# Patient Record
Sex: FEMALE | Employment: OTHER | URBAN - METROPOLITAN AREA
[De-identification: names, ages, dates, MRNs, and addresses within clinical notes are randomized per-mention and may not be internally consistent; named-entity substitution may affect disease eponyms.]

---

## 2020-01-13 ENCOUNTER — THERAPY VISIT (OUTPATIENT)
Dept: PHYSICAL THERAPY | Facility: CLINIC | Age: 65
End: 2020-01-13
Payer: COMMERCIAL

## 2020-01-13 DIAGNOSIS — M54.50 LUMBAGO: ICD-10-CM

## 2020-01-13 PROCEDURE — 97530 THERAPEUTIC ACTIVITIES: CPT | Mod: GP | Performed by: PHYSICAL THERAPIST

## 2020-01-13 PROCEDURE — 97161 PT EVAL LOW COMPLEX 20 MIN: CPT | Mod: GP | Performed by: PHYSICAL THERAPIST

## 2020-01-13 PROCEDURE — 97110 THERAPEUTIC EXERCISES: CPT | Mod: GP | Performed by: PHYSICAL THERAPIST

## 2020-01-13 NOTE — PROGRESS NOTES
"[facility] Initial Evaluation  Subjective:  The history is provided by the patient. No  was used.        Problem details: Patient saw MD 1-6-20 for chronic LBP with recent exacerbation of with onset of intermittent \"sharp\" pains to the right of the spine.  Pain is described as \"aching\", central low back primarily with intermittent \"sharp\" pains to the right of the spine and she gets \"tired\" due to her low back, rates symptoms 0-3/10.  She denies buttock or LE symptoms.  Symptoms increase most with standing 1', tolerance of 15-20', walking>10' (better with cart), doing dishes, bending backwards, sitting >1 hour especially if playing cards and repeatedly reaching, vacuuming and sweeping>a couple minutes. She also needs to hoe in her garden sitting in a chair due to LBP.  Symptoms decrease with sitting down temporarily, lying down and using ice.  Patient sleeps prone without increase pain, does not wake due to pain.  Patient had MRI 1 month ago, was told not progressed from last imaging.  .             General health as reported by patient is good. Pertinent medical history includes:  Overweight, cancer and osteoarthritis.   Other medical allergies details: none.  Surgeries include:  Orthopedic surgery. Other surgery history details: left TKA.  Current medications:  Anti-inflammatory (cholesterol, acid reflux, ibuprofen 2-3x/day (2400 or less mg/day), Tylenol 3000 mg/day).       Pain is the same all the time. Since onset symptoms are gradually worsening. Special tests:  MRI.     Patient is Retired nurse.   Barriers include:  None as reported by patient.  Red flags:  None as reported by patient.                      Objective:  Standing Alignment:        Lumbar:  Lordosis decr                           Lumbar/SI Evaluation  ROM:    AROM Lumbar:   Flexion:          WNL  Ext:                    25%   Side Bend:        Left:     Right:   Rotation:           Left:     Right:   Side " Glide:        Left:  WNL    Right:  WNL          Lumbar Myotomes:  not assessed                  Neural Tension/Mobility:      Left side:Slump  negative.     Right side:   Slump  negative.         Spinal Segmental Conclusions: Tenderness with spring testing L5-L3                                                        General     ROS  Symptoms prior to test movements:  1/10 central LB  Correction of sitting posture with lumbar roll:  No effect  RUI with hips against counter:  No effect  Prone:  1/10 central  REIL:  Increase, no worse        Assessment/Plan:    Patient is a 64 year old female with lumbar complaints.    Patient has the following significant findings with corresponding treatment plan.                Diagnosis 1:  LBP    Pain -  self management, education, directional preference exercise and home program  Decreased ROM/flexibility - therapeutic exercise and home program  Decreased function - therapeutic activities and home program  Impaired posture - neuro re-education and home program      Low complexity using standardized patient assessment instrument and/or measureable assessment of functional outcome.  Cumulative Therapy Evaluation is: Low complexity.    Previous and current functional limitations:  (See Goal Flow Sheet for this information)    Short term and Long term goals: (See Goal Flow Sheet for this information)     Communication ability:  Patient appears to be able to clearly communicate and understand verbal and written communication and follow directions correctly.  Treatment Explanation - The following has been discussed with the patient:   RX ordered/plan of care  Anticipated outcomes  Possible risks and side effects  This patient would benefit from PT intervention to resume normal activities.   Rehab potential is good.    Frequency:  1 X week, once daily  Duration:  for 6 weeks  Discharge Plan:  Achieve all LTG.  Independent in home treatment program.  Reach maximal therapeutic  benefit.    Please refer to the daily flowsheet for treatment today, total treatment time and time spent performing 1:1 timed codes.

## 2020-01-13 NOTE — LETTER
"[facility] PHYSICAL THERAPY  [address] [place] [id number] [phone]    2020    Re: Belkis Rodriguez   :   1955  MRN:  [id number]   REFERRING PHYSICIAN:   Sameer Salmon    [facility] PHYSICAL [facility]    Date of Initial Evaluation:  2020  Visits:  Rxs Used: 1  Reason for Referral:  Lumbago    EVALUATION SUMMARY    [facility] Initial Evaluation  Subjective:  Problem details: Patient saw MD 20 for chronic LBP with recent exacerbation of with onset of intermittent \"sharp\" pains to the right of the spine.  Pain is described as \"aching\", central low back primarily with intermittent \"sharp\" pains to the right of the spine and she gets \"tired\" due to her low back, rates symptoms 0-3/10.  She denies buttock or LE symptoms.  Symptoms increase most with standing 1', tolerance of 15-20', walking>10' (better with cart), doing dishes, bending backwards, sitting >1 hour especially if playing cards and repeatedly reaching, vacuuming and sweeping>a couple minutes. She also needs to hoe in her garden sitting in a chair due to LBP.  Symptoms decrease with sitting down temporarily, lying down and using ice.  Patient sleeps prone without increase pain, does not wake due to pain.  Patient had MRI 1 month ago, was told not progressed from last imaging. General health as reported by patient is good. Pertinent medical history includes:  Overweight, cancer and osteoarthritis. Other medical allergies details: none.  Surgeries include:  Orthopedic surgery. Other surgery history details: left TKA.  Current medications:  Anti-inflammatory (cholesterol, acid reflux, ibuprofen 2-3x/day (2400 or less mg/day), Tylenol 3000 mg/day).  Pain is the same all the time. Since onset symptoms are gradually worsening. Special tests:  MRI.   Patient is Retired nurse.  Barriers include:  None as reported by patient.  " Red flags:  None as reported by patient.    Objective:  Standing Alignment:    Lumbar:  Lordosis decr  Lumbar/SI Evaluation  ROM:    AROM Lumbar:   Flexion:          WNL  Ext:                    25%   Side Bend:        Left:     Right:   Rotation:           Left:     Right:   Side Glide:        Left:  WNL    Right:  WNL  Lumbar Myotomes:  not assessed  Neural Tension/Mobility:    Left side:Slump  negative.   Right side:   Slump  negative.   Spinal Segmental Conclusions: Tenderness with spring testing L5-L3     ROS  Symptoms prior to test movements:  1/10 central LB  Correction of sitting posture with lumbar roll:  No effect  RUI with hips against counter:  No effect  Prone:  1/10 central  REIL:  Increase, no worse    Assessment/Plan:    Patient is a 64 year old female with lumbar complaints.    Patient has the following significant findings with corresponding treatment plan.                Diagnosis 1:  LBP  Pain -  self management, education, directional preference exercise and home program  Decreased ROM/flexibility - therapeutic exercise and home program  Decreased function - therapeutic activities and home program  Impaired posture - neuro re-education and home program    Low complexity using standardized patient assessment instrument and/or measureable assessment of functional outcome.  Cumulative Therapy Evaluation is: Low complexity.    Previous and current functional limitations:  (See Goal Flow Sheet for this information)    Short term and Long term goals: (See Goal Flow Sheet for this information)     Communication ability:  Patient appears to be able to clearly communicate and understand verbal and written communication and follow directions correctly.  Treatment Explanation - The following has been discussed with the patient:   RX ordered/plan of care  Anticipated outcomes  Possible risks and side effects  This patient would benefit from PT intervention to resume normal activities.   Rehab potential is  good.    Frequency:  1 X week, once daily  Duration:  for 6 weeks  Discharge Plan:  Achieve all LTG.  Independent in home treatment program.  Reach maximal therapeutic benefit.    Thank you for your referral.    INQUIRIES  Therapist: Mansi Santoro PT   [facility] PHYSICAL THERAPY  [address] [address]  Phone: [phone]  Fax: [phone]

## 2020-01-22 ENCOUNTER — THERAPY VISIT (OUTPATIENT)
Dept: PHYSICAL THERAPY | Facility: CLINIC | Age: 65
End: 2020-01-22
Payer: COMMERCIAL

## 2020-01-22 DIAGNOSIS — M54.50 LUMBAGO: ICD-10-CM

## 2020-01-22 PROCEDURE — 97530 THERAPEUTIC ACTIVITIES: CPT | Mod: GP | Performed by: PHYSICAL THERAPIST

## 2020-01-22 PROCEDURE — 97110 THERAPEUTIC EXERCISES: CPT | Mod: GP | Performed by: PHYSICAL THERAPIST

## 2020-02-03 ENCOUNTER — THERAPY VISIT (OUTPATIENT)
Dept: PHYSICAL THERAPY | Facility: CLINIC | Age: 65
End: 2020-02-03
Payer: COMMERCIAL

## 2020-02-03 DIAGNOSIS — M54.50 LUMBAGO: ICD-10-CM

## 2020-02-03 PROCEDURE — 97110 THERAPEUTIC EXERCISES: CPT | Mod: GP | Performed by: PHYSICAL THERAPIST

## 2020-03-20 PROBLEM — M54.50 LUMBAGO: Status: RESOLVED | Noted: 2020-01-13 | Resolved: 2020-03-20

## 2020-03-20 NOTE — PROGRESS NOTES
"Discharge Note    Progress reporting period is from 1-13-19 to Feb 3, 2020.      Belkis failed to follow up and current status is unknown.  Please see information below for last relevant information on current status.  Patient seen for 3 visits.    SUBJECTIVE  At last visit she reported her cough was improving but still present.  Slowly improving, but was more sore that day overall than typical - \"all over\" with symptoms LB and bilateral knees.  [place] sat and played cards, more sitting than usual over the weekend as well.  Continues to be consistent with REIL and REIL with pt OP - at least 4-5x/day, does on bed.  Intermittently does RUI as well using hands or counter and has started to do with onset of symptoms in standing and is pleasantly surprised she can reduce or abolish symptoms temporarily.   .  Pain level at last visit 1/10(LB ).     Previous pain level was  (0-3/10).   Changes in function:  Yes (See Goal flowsheet attached for changes in current functional level)  Adverse reaction to treatment or activity: None    OBJECTIVE  Unknown at discharge as patient did not return as planned.    ASSESSMENT/PLAN  Diagnosis: LBP   Patient did not return for further visits so problems/goal status is unknown.  Assessment of Progress: current status is unknown.    Last current status: Pt is progressing slower than anticipated   Self Management Plans:  HEP  I have re-evaluated this patient and find that the nature, scope, duration and intensity of the therapy is appropriate for the medical condition of the patient.  Belkis continues to require the following intervention to meet STG and LTG's:  HEP.    Recommendations:  Patient did not return for follow-up visits as expected.  Will discharge physical therapy chart at this time.      Please refer to the daily flowsheet for treatment today, total treatment time and time spent performing 1:1 timed codes.        "

## 2020-10-26 ENCOUNTER — THERAPY VISIT (OUTPATIENT)
Dept: PHYSICAL THERAPY | Facility: CLINIC | Age: 65
End: 2020-10-26
Payer: COMMERCIAL

## 2020-10-26 DIAGNOSIS — Z96.651 AFTERCARE FOLLOWING RIGHT KNEE JOINT REPLACEMENT SURGERY: Primary | ICD-10-CM

## 2020-10-26 DIAGNOSIS — Z47.1 AFTERCARE FOLLOWING RIGHT KNEE JOINT REPLACEMENT SURGERY: Primary | ICD-10-CM

## 2020-10-26 PROCEDURE — 97110 THERAPEUTIC EXERCISES: CPT | Mod: GP | Performed by: PHYSICAL THERAPIST

## 2020-10-26 PROCEDURE — 97161 PT EVAL LOW COMPLEX 20 MIN: CPT | Mod: GP | Performed by: PHYSICAL THERAPIST

## 2020-10-26 ASSESSMENT — ACTIVITIES OF DAILY LIVING (ADL)
HOW_WOULD_YOU_RATE_THE_OVERALL_FUNCTION_OF_YOUR_KNEE_DURING_YOUR_USUAL_DAILY_ACTIVITIES?: ABNORMAL
PAIN: THE SYMPTOM AFFECTS MY ACTIVITY MODERATELY
SIT WITH YOUR KNEE BENT: ACTIVITY IS NOT DIFFICULT
RISE FROM A CHAIR: ACTIVITY IS SOMEWHAT DIFFICULT
SWELLING: THE SYMPTOM AFFECTS MY ACTIVITY MODERATELY
WEAKNESS: THE SYMPTOM AFFECTS MY ACTIVITY SEVERELY
GO DOWN STAIRS: ACTIVITY IS SOMEWHAT DIFFICULT
SQUAT: ACTIVITY IS FAIRLY DIFFICULT
KNEE_ACTIVITY_OF_DAILY_LIVING_SCORE: 48.57
KNEE_ACTIVITY_OF_DAILY_LIVING_SUM: 34
RAW_SCORE: 34
HOW_WOULD_YOU_RATE_THE_CURRENT_FUNCTION_OF_YOUR_KNEE_DURING_YOUR_USUAL_DAILY_ACTIVITIES_ON_A_SCALE_FROM_0_TO_100_WITH_100_BEING_YOUR_LEVEL_OF_KNEE_FUNCTION_PRIOR_TO_YOUR_INJURY_AND_0_BEING_THE_INABILITY_TO_PERFORM_ANY_OF_YOUR_USUAL_DAILY_ACTIVITIES?: 30
LIMPING: THE SYMPTOM AFFECTS MY ACTIVITY MODERATELY
GO UP STAIRS: ACTIVITY IS SOMEWHAT DIFFICULT
KNEEL ON THE FRONT OF YOUR KNEE: I AM UNABLE TO DO THE ACTIVITY
STIFFNESS: THE SYMPTOM AFFECTS MY ACTIVITY MODERATELY
GIVING WAY, BUCKLING OR SHIFTING OF KNEE: THE SYMPTOM AFFECTS MY ACTIVITY SLIGHTLY
STAND: ACTIVITY IS FAIRLY DIFFICULT
WALK: ACTIVITY IS MINIMALLY DIFFICULT
AS_A_RESULT_OF_YOUR_KNEE_INJURY,_HOW_WOULD_YOU_RATE_YOUR_CURRENT_LEVEL_OF_DAILY_ACTIVITY?: ABNORMAL

## 2020-10-26 NOTE — LETTER
"[facility] PHYSICAL THERAPY  [address] [id number] [phone]    2020    Re: Belkis Rodriguez   :   1955  MRN:  [id number]   REFERRING PHYSICIAN:   Curt Dubon    [facility] PHYSICAL THERAPY    Date of Initial Evaluation:  10/26/2020  Visits:  Rxs Used: 1  Reason for Referral:  Aftercare following right knee joint replacement surgery    EVALUATION SUMMARY    [facility] Initial Evaluation  Subjective:  The history is provided by the patient. No  was used.   Therapist Generated HPI Evaluation  Problem details: Patient underwent right TKA 10-9-20, staples out 10-23-20.  She had home PT 4 visits. Pain is ranging 0-7/10, \"achy\" and \"sharp\".  Symptoms increase with getting up from chair, standing tolerance 5' or less (LB >knee), in-home walking only with FWW, doing stairs nonreciprocally with railing and wall support on other side, \"interrupted\" sleep, losing <1 hour sleep.  Symptoms decrease with ice, prescription pain medication.  Patient lives with  in house with stairs, who recently had shoulder surgery.  She is taking standing showers, no grab bars or chair.  She has not driven yet.  She is currently doing sitting and supine heel slides, quad sets, TKEs supine, SLR flexion.  Pain is the same all the time.  Since onset symptoms are gradually improving.  Barriers include:  Stairs.    Patient Health History  General health as reported by patient is good.  Pertinent medical history includes: cancer, implanted device, menopausal, osteoarthritis and overweight.   Red flags:  None as reported by patient.  Medical allergies: none.   Surgeries include:  Orthopedic surgery. Other surgery history details: L total knee 10-14, left shoulder, bilateral feet, spine.    Current medications:  Pain medication and anti-inflammatory.    Current occupation is Retired " nurse.     Objective:  Standing Alignment:    Knee deviations alignment: steri-strips, no redness or drainage, min swelling and warmth.  Gait:  Decrease odalys  Assistive Devices:  Walker    Knee Evaluation:  ROM:    AROM  Extension:  Left: 0    Right:  9 sitting, 5 supine  Flexion: Left: WNL    Right: 82 sitting  PROM  Flexion: Left:   Right:  99 sitting     ROS  MMT:  Left knee/hip/ankle WNL  MMT:  Right hip 4/5, left knee testing deferred, ankle 4+/5  Right quadricep set fair, with cuing     Assessment/Plan:    Patient is a 65 year old female with right side knee complaints.    Patient has the following significant findings with corresponding treatment plan.                Diagnosis 1:  S/p R TKA 10-9-20  Pain -  hot/cold therapy, self management, education and home program  Decreased ROM/flexibility - therapeutic exercise and home program  Decreased strength - therapeutic exercise, therapeutic activities and home program  Inflammation - cold therapy and self management/home program  Impaired gait - gait training, assistive devices and home program  Decreased function - therapeutic activities and home program      Cumulative Therapy Evaluation is: Low complexity.    Previous and current functional limitations:  (See Goal Flow Sheet for this information)    Short term and Long term goals: (See Goal Flow Sheet for this information)   Communication ability:  Patient appears to be able to clearly communicate and understand verbal and written communication and follow directions correctly.  Treatment Explanation - The following has been discussed with the patient:   RX ordered/plan of care  Anticipated outcomes  Possible risks and side effects  This patient would benefit from PT intervention to resume normal activities.   Rehab potential is excellent.  Frequency:  1 X week, once daily  Duration:  for 6-8 weeks  Discharge Plan:  Achieve all LTG.  Independent in home treatment program.  Reach maximal therapeutic  benefit.    Thank you for your referral.    INQUIRIES  Therapist: Mansi Santoro PT  [facility] PHYSICAL THERAPY  [address] [place] [id number]  Phone: [phone]  Fax: [phone]

## 2020-10-26 NOTE — PROGRESS NOTES
"[facility] Initial Evaluation  Subjective:  The history is provided by the patient. No  was used.   Therapist Generated HPI Evaluation  Problem details: Patient underwent right TKA 10-9-20, staples out 10-23-20.  She had home PT 4 visits. Pain is ranging 0-7/10, \"achy\" and \"sharp\".  Symptoms increase with getting up from chair, standing tolerance 5' or less (LB >knee), in-home walking only with FWW, doing stairs nonreciprocally with railing and wall support on other side, \"interrupted\" sleep, losing <1 hour sleep.  Symptoms decrease with ice, prescription pain medication.  Patient lives with  in house with stairs, who recently had shoulder surgery.  She is taking standing showers, no grab bars or chair.  She has not driven yet.  She is currently doing sitting and supine heel slides, quad sets, TKEs supine, SLR flexion.      .                     Pain is the same all the time.  Since onset symptoms are gradually improving.         Barriers include:  Stairs.    Patient Health History           General health as reported by patient is good.  Pertinent medical history includes: cancer, implanted device, menopausal, osteoarthritis and overweight.   Red flags:  None as reported by patient.  Medical allergies: none.   Surgeries include:  Orthopedic surgery. Other surgery history details: L total knee 10-14, left shoulder, bilateral feet, spine.    Current medications:  Pain medication and anti-inflammatory.    Current occupation is Retired nurse.                                       Objective:  Standing Alignment:              Knee deviations alignment: steri-strips, no redness or drainage, min swelling and warmth.      Gait:  Decrease odalys  Assistive Devices:  Walker                                                        Knee Evaluation:  ROM:    AROM      Extension:  Left: 0    Right:  9 sitting, 5 supine  Flexion: Left: WNL    Right: 82 sitting  PROM        Flexion: " Left:   Right:  99 sitting                         General     ROS  MMT:  Left knee/hip/ankle WNL  MMT:  Right hip 4/5, left knee testing deferred, ankle 4+/5  Right quadricep set fair, with cuing     Assessment/Plan:    Patient is a 65 year old female with right side knee complaints.    Patient has the following significant findings with corresponding treatment plan.                Diagnosis 1:  S/p R TKA 10-9-20    Pain -  hot/cold therapy, self management, education and home program  Decreased ROM/flexibility - therapeutic exercise and home program  Decreased strength - therapeutic exercise, therapeutic activities and home program  Inflammation - cold therapy and self management/home program  Impaired gait - gait training, assistive devices and home program  Decreased function - therapeutic activities and home program      Cumulative Therapy Evaluation is: Low complexity.    Previous and current functional limitations:  (See Goal Flow Sheet for this information)    Short term and Long term goals: (See Goal Flow Sheet for this information)     Communication ability:  Patient appears to be able to clearly communicate and understand verbal and written communication and follow directions correctly.  Treatment Explanation - The following has been discussed with the patient:   RX ordered/plan of care  Anticipated outcomes  Possible risks and side effects  This patient would benefit from PT intervention to resume normal activities.   Rehab potential is excellent.    Frequency:  1 X week, once daily  Duration:  for 6-8 weeks  Discharge Plan:  Achieve all LTG.  Independent in home treatment program.  Reach maximal therapeutic benefit.    Please refer to the daily flowsheet for treatment today, total treatment time and time spent performing 1:1 timed codes.

## 2020-10-29 ENCOUNTER — THERAPY VISIT (OUTPATIENT)
Dept: PHYSICAL THERAPY | Facility: CLINIC | Age: 65
End: 2020-10-29
Payer: COMMERCIAL

## 2020-10-29 DIAGNOSIS — Z96.651 AFTERCARE FOLLOWING RIGHT KNEE JOINT REPLACEMENT SURGERY: ICD-10-CM

## 2020-10-29 DIAGNOSIS — Z47.1 AFTERCARE FOLLOWING RIGHT KNEE JOINT REPLACEMENT SURGERY: ICD-10-CM

## 2020-10-29 PROCEDURE — 97110 THERAPEUTIC EXERCISES: CPT | Mod: GP | Performed by: PHYSICAL THERAPIST

## 2020-10-29 PROCEDURE — 97530 THERAPEUTIC ACTIVITIES: CPT | Mod: GP | Performed by: PHYSICAL THERAPIST

## 2020-11-02 ENCOUNTER — THERAPY VISIT (OUTPATIENT)
Dept: PHYSICAL THERAPY | Facility: CLINIC | Age: 65
End: 2020-11-02
Payer: COMMERCIAL

## 2020-11-02 DIAGNOSIS — Z96.651 AFTERCARE FOLLOWING RIGHT KNEE JOINT REPLACEMENT SURGERY: ICD-10-CM

## 2020-11-02 DIAGNOSIS — Z47.1 AFTERCARE FOLLOWING RIGHT KNEE JOINT REPLACEMENT SURGERY: ICD-10-CM

## 2020-11-02 PROCEDURE — 97530 THERAPEUTIC ACTIVITIES: CPT | Mod: GP | Performed by: PHYSICAL THERAPIST

## 2020-11-02 PROCEDURE — 97110 THERAPEUTIC EXERCISES: CPT | Mod: GP | Performed by: PHYSICAL THERAPIST

## 2020-11-09 ENCOUNTER — THERAPY VISIT (OUTPATIENT)
Dept: PHYSICAL THERAPY | Facility: CLINIC | Age: 65
End: 2020-11-09
Payer: COMMERCIAL

## 2020-11-09 DIAGNOSIS — Z96.651 AFTERCARE FOLLOWING RIGHT KNEE JOINT REPLACEMENT SURGERY: ICD-10-CM

## 2020-11-09 DIAGNOSIS — Z47.1 AFTERCARE FOLLOWING RIGHT KNEE JOINT REPLACEMENT SURGERY: ICD-10-CM

## 2020-11-09 PROCEDURE — 97110 THERAPEUTIC EXERCISES: CPT | Mod: GP | Performed by: PHYSICAL THERAPIST

## 2020-11-09 PROCEDURE — 97530 THERAPEUTIC ACTIVITIES: CPT | Mod: GP | Performed by: PHYSICAL THERAPIST

## 2020-11-09 ASSESSMENT — ACTIVITIES OF DAILY LIVING (ADL)
KNEEL ON THE FRONT OF YOUR KNEE: I AM UNABLE TO DO THE ACTIVITY
SQUAT: ACTIVITY IS MINIMALLY DIFFICULT
GO DOWN STAIRS: ACTIVITY IS SOMEWHAT DIFFICULT
KNEE_ACTIVITY_OF_DAILY_LIVING_SCORE: 67.14
HOW_WOULD_YOU_RATE_THE_CURRENT_FUNCTION_OF_YOUR_KNEE_DURING_YOUR_USUAL_DAILY_ACTIVITIES_ON_A_SCALE_FROM_0_TO_100_WITH_100_BEING_YOUR_LEVEL_OF_KNEE_FUNCTION_PRIOR_TO_YOUR_INJURY_AND_0_BEING_THE_INABILITY_TO_PERFORM_ANY_OF_YOUR_USUAL_DAILY_ACTIVITIES?: 60
GIVING WAY, BUCKLING OR SHIFTING OF KNEE: I DO NOT HAVE THE SYMPTOM
KNEE_ACTIVITY_OF_DAILY_LIVING_SUM: 47
STAND: ACTIVITY IS MINIMALLY DIFFICULT
LIMPING: I DO NOT HAVE THE SYMPTOM
STIFFNESS: THE SYMPTOM AFFECTS MY ACTIVITY SLIGHTLY
WALK: ACTIVITY IS NOT DIFFICULT
SIT WITH YOUR KNEE BENT: ACTIVITY IS NOT DIFFICULT
GO UP STAIRS: ACTIVITY IS SOMEWHAT DIFFICULT
HOW_WOULD_YOU_RATE_THE_OVERALL_FUNCTION_OF_YOUR_KNEE_DURING_YOUR_USUAL_DAILY_ACTIVITIES?: NEARLY NORMAL
SWELLING: THE SYMPTOM AFFECTS MY ACTIVITY SLIGHTLY
AS_A_RESULT_OF_YOUR_KNEE_INJURY,_HOW_WOULD_YOU_RATE_YOUR_CURRENT_LEVEL_OF_DAILY_ACTIVITY?: NEARLY NORMAL
PAIN: THE SYMPTOM AFFECTS MY ACTIVITY SLIGHTLY
RAW_SCORE: 47
WEAKNESS: THE SYMPTOM AFFECTS MY ACTIVITY MODERATELY
RISE FROM A CHAIR: ACTIVITY IS FAIRLY DIFFICULT

## 2020-11-09 NOTE — PROGRESS NOTES
Subjective:  HPI  Physical Exam                    Objective:  System    Physical Exam    General     ROS    Assessment/Plan:    PROGRESS  REPORT    Progress reporting period is from 10-26-20 to 11-9-20, 4 visits.       SUBJECTIVE  Continues to improve.  Pain up to 4/10 one day after sitting playing cards and [place] after.  Has been driving consistently, no problems getting in/out and stopping.  Consistent with exercises and ice. Now doing stairs up and down with bilateral railings reciprocal. Using SEC except for first thing in the a.m.  Decreased use of pain medication.   Current Pain level: 1/10.      Initial Pain level: (0-7/10).   Changes in function:  Yes (See Goal flowsheet attached for changes in current functional level)  Adverse reaction to treatment or activity: None    OBJECTIVE  Arrived with SEC with near normal gait pattern.    AROM right knee sitting 3-109 degrees, PROM to 113 degrees.       ASSESSMENT/PLAN  Updated problem list and treatment plan: Diagnosis 1:  S/p R TKA    Pain -  hot/cold therapy, self management and home program  Decreased ROM/flexibility - therapeutic exercise and home program  Decreased strength - therapeutic exercise and home program  Impaired gait - home program  STG/LTGs have been met or progress has been made towards goals:  Yes (See Goal flow sheet completed today.)  Assessment of Progress: The patient's condition is improving.  Self Management Plans:  Patient is independent in a home treatment program.      Recommendations:  Plan to recheck with MD 11-18-20.  Recommend if continuing to progress and no questions, patient would be able to continue independently with HEP vs continuing PT.      Please refer to the daily flowsheet for treatment today, total treatment time and time spent performing 1:1 timed codes.    Addendum:    11-18-20 patient called after MD visit.  Agreed with continuing PT at home.  Is doing well.  Will discharge PT chart at this time.  See PN  above dated 11-9-20 and goal sheet for discharge status.  Mansi Santoro PT